# Patient Record
Sex: MALE | Race: WHITE
[De-identification: names, ages, dates, MRNs, and addresses within clinical notes are randomized per-mention and may not be internally consistent; named-entity substitution may affect disease eponyms.]

---

## 2021-05-14 ENCOUNTER — HOSPITAL ENCOUNTER (EMERGENCY)
Dept: HOSPITAL 75 - ER FS | Age: 36
Discharge: HOME | End: 2021-05-14
Payer: SELF-PAY

## 2021-05-14 VITALS — HEIGHT: 70.98 IN | WEIGHT: 196.87 LBS | BODY MASS INDEX: 27.56 KG/M2

## 2021-05-14 VITALS — SYSTOLIC BLOOD PRESSURE: 130 MMHG | DIASTOLIC BLOOD PRESSURE: 79 MMHG

## 2021-05-14 DIAGNOSIS — S69.91XA: Primary | ICD-10-CM
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
## 2021-05-14 PROCEDURE — 73130 X-RAY EXAM OF HAND: CPT

## 2021-05-14 PROCEDURE — 73110 X-RAY EXAM OF WRIST: CPT

## 2021-05-14 NOTE — DIAGNOSTIC IMAGING REPORT
EXAMINATION: Right hand at 1257 hours.



INDICATION: Injury, hand pain.



FINDINGS: There is no fracture, dislocation or acute bony

abnormality identified. There is mild deformity of the 5th

metacarpal shaft. This may be a sequela of prior trauma. The soft

tissues are unremarkable.



IMPRESSION:



There is no evidence for an acute bony abnormality.



Dictated by: 



  Dictated on workstation # [id number]

## 2021-05-14 NOTE — ED UPPER EXTREMITY
General


Chief Complaint:  Upper Extremity


Stated Complaint:  RT HAND INJ


Nursing Triage Note:  


Patient presents to the ED with c/o of right hand/wrist pain. He states that he 


punched his truck on Sunday and since then his wrist has been red, swollen,and 


painful.


Nursing Sepsis Screen:  No Definite Risk


Source:  patient


Exam Limitations:  no limitations





History of Present Illness


Date Seen by Provider:  May 14, 2021


Time Seen by Provider:  12:30


Initial Comments


Patient is a 35-year-old right-handed male who presents with right dorsal wrist 

pain tenderness and swelling x5 days after punching a metal truck door.  Patient

states he has pain with use of left thumb index and middle fingers.  There is no

obvious deformity or malalignment.  Patient does have diffuse tenderness over 

the dorsum of the proximal and distal radial wrist.  There is no anatomical 

snuff tenderness.  Sensation is intact throughout.  Patient has not sought care 

for this injury prior to today's ED visit.


Onset:  just prior to arrival


Pain/Injury Location:  right wrist


Method of Injury:  other


Modifying Factors:  Improves With Other





Allergies and Home Medications


Allergies


Coded Allergies:  


     No Known Drug Allergies (Unverified , 5/14/21)





Patient Home Medication List


Home Medication List Reviewed:  Yes





Review of Systems


Constitutional:  see HPI


EENTM:  see HPI


Respiratory:  see HPI


Cardiovascular:  see HPI


Genitourinary:  see HPI


Musculoskeletal:  see HPI


Skin:  see HPI


Psychiatric/Neurological:  See HPI





All Other Systems Reviewed


Negative Unless Noted:  Yes





Past Medical-Social-Family Hx


Past Med/Social Hx:  Reviewed Nursing Past Med/Soc Hx


Patient Social History


Alcohol Use:  Denies Use


Smoking Status:  Never a Smoker


2nd Hand Smoke Exposure:  No


Recent Infectious Disease Expo:  No


Recent Hopitalizations:  No





Seasonal Allergies


Seasonal Allergies:  No





Past Medical History


Surgeries:  No


Respiratory:  No


Cardiac:  No


Neurological:  No


Genitourinary:  No


Gastrointestinal:  No


Musculoskeletal:  No


Endocrine:  No


HEENT:  No


Cancer:  No


Psychosocial:  No


Integumentary:  No


Blood Disorders:  No





Physical Exam


Vital Signs





Vital Signs - First Documented








 5/14/21





 12:45


 


Temp 36.7


 


Pulse 96


 


Resp 16


 


B/P (MAP) 130/79 (96)


 


Pulse Ox 100


 


O2 Delivery Room Air





Capillary Refill : Less Than 3 Seconds


Height, Weight, BMI


Height: '"


Weight: lbs. oz. kg; 27.00 BMI


Method:


General Appearance:  no apparent distress


Wrist:  Yes bone tenderness, Yes limited ROM, Yes pain, Yes soft tissue 

tenderness, Yes swelling


Hand:  bone tenderness, limited ROM, soft tissue tenderness, stiffness, swelling


Neurologic/Tendon:  normal sensation


Neurologic/Psychiatric:  normal mood/affect, oriented x 3





Progress/Results/Core Measures


Results/Orders


My Orders





Orders - SIMBA VELASCO DO


Hand 3 View Right (5/14/21 12:50)


Wrist 3 View Right (5/14/21 12:50)





Vital Signs/I&O











 5/14/21





 12:45


 


Temp 36.7


 


Pulse 96


 


Resp 16


 


B/P (MAP) 130/79 (96)


 


Pulse Ox 100


 


O2 Delivery Room Air














Blood Pressure Mean:                    96











Departure


Communication (Admissions)


Right wrist/hand: No obvious displaced fracture. 





Patient placed in thumb spica splint and instructed to follow-up with local PCP.

 He may benefit from outpatient CT if symptoms persist.





Impression





   Primary Impression:  


   Right wrist injury


Disposition:  01 HOME, SELF-CARE


Condition:  Stable





Departure-Patient Inst.


Decision time for Depature:  13:38


Referrals:  


NO,LOCAL PHYSICIAN (PCP/Family)


Primary Care Physician


Patient Instructions:  Common Wrist Injuries





Add. Discharge Instructions:  


Please wear your thumb spica splint and take ibuprofen and tramadol for pain.  

Follow-up with your local PCP in 2 to 3 days for reevaluation.  If your symptoms

persist you may benefit from an outpatient CT scan for further evaluation and 

outpatient orthopaedic referral.





All discharge instructions reviewed with patient and/or family. Voiced 

understanding.


Scripts


Tramadol HCl (Tramadol HCl) 50 Mg Tablet


50 MG PO Q6H PRN for PAIN for 3 Days, #10 TAB 0 Refills


   Prov: SIMBA VELASCO DO         5/14/21











SIMBA VELASCO DO                   May 14, 2021 13:37

## 2021-05-14 NOTE — DIAGNOSTIC IMAGING REPORT
INDICATION: Trauma, pain



COMPARISON: Imaging of the hand from the same date



TECHNIQUE: Three radiographs of the right wrist dated 05/14/2021 



FINDINGS: No acute fracture or dislocation. No destructive

osseous process. Mild deformity of the 5th metacarpal is noted

with slightly increased bowing. Carpal alignment is

well-maintained. No suspicious radiopaque foreign body.



IMPRESSION: 



No acute osseous abnormality.



Mild deformity of the 5th metacarpal shaft, likely related to

remote chronic posttraumatic deformity.



Dictated by: 



  Dictated on workstation # [id number]